# Patient Record
Sex: MALE | Race: WHITE | NOT HISPANIC OR LATINO | URBAN - METROPOLITAN AREA
[De-identification: names, ages, dates, MRNs, and addresses within clinical notes are randomized per-mention and may not be internally consistent; named-entity substitution may affect disease eponyms.]

---

## 2017-12-27 ENCOUNTER — FOLLOW UP ESTABLISHED (OUTPATIENT)
Dept: URBAN - METROPOLITAN AREA CLINIC 44 | Facility: CLINIC | Age: 82
End: 2017-12-27
Payer: MEDICARE

## 2017-12-27 DIAGNOSIS — H35.3131 NEXDTVE AGE-RELATED MCLR DEGN, BILATERAL, EARLY DRY STAGE: Primary | ICD-10-CM

## 2017-12-27 DIAGNOSIS — H40.1132 PRIMARY OPEN-ANGLE GLAUCOMA, BILATERAL, MODERATE STAGE: ICD-10-CM

## 2017-12-27 PROCEDURE — 92014 COMPRE OPH EXAM EST PT 1/>: CPT | Performed by: OPTOMETRIST

## 2017-12-27 PROCEDURE — 92134 CPTRZ OPH DX IMG PST SGM RTA: CPT | Performed by: OPTOMETRIST

## 2017-12-27 ASSESSMENT — INTRAOCULAR PRESSURE
OD: 17
OS: 18
OS: 17
OD: 18

## 2017-12-27 ASSESSMENT — KERATOMETRY
OD: 42.50
OS: 42.50

## 2017-12-27 ASSESSMENT — VISUAL ACUITY
OD: 20/20
OS: 20/25

## 2018-04-10 ENCOUNTER — FOLLOW UP ESTABLISHED (OUTPATIENT)
Dept: URBAN - METROPOLITAN AREA CLINIC 44 | Facility: CLINIC | Age: 83
End: 2018-04-10
Payer: MEDICARE

## 2018-04-10 PROCEDURE — 92133 CPTRZD OPH DX IMG PST SGM ON: CPT | Performed by: OPTOMETRIST

## 2018-04-10 PROCEDURE — 92014 COMPRE OPH EXAM EST PT 1/>: CPT | Performed by: OPTOMETRIST

## 2018-04-10 ASSESSMENT — INTRAOCULAR PRESSURE
OD: 15
OS: 14

## 2018-06-21 ENCOUNTER — HOSPITAL ENCOUNTER (INPATIENT)
Dept: HOSPITAL 46 - ED | Age: 83
LOS: 7 days | Discharge: NURSING, REHAB OR LONG-TERM CARE | DRG: 291 | End: 2018-06-28
Attending: INTERNAL MEDICINE | Admitting: INTERNAL MEDICINE
Payer: MEDICARE

## 2018-06-21 VITALS — BODY MASS INDEX: 30.98 KG/M2 | WEIGHT: 209.15 LBS | HEIGHT: 69 IN

## 2018-06-21 DIAGNOSIS — G30.9: ICD-10-CM

## 2018-06-21 DIAGNOSIS — N40.0: ICD-10-CM

## 2018-06-21 DIAGNOSIS — J96.01: ICD-10-CM

## 2018-06-21 DIAGNOSIS — E78.5: ICD-10-CM

## 2018-06-21 DIAGNOSIS — F02.80: ICD-10-CM

## 2018-06-21 DIAGNOSIS — Z66: ICD-10-CM

## 2018-06-21 DIAGNOSIS — Z79.01: ICD-10-CM

## 2018-06-21 DIAGNOSIS — Z79.899: ICD-10-CM

## 2018-06-21 DIAGNOSIS — I08.0: ICD-10-CM

## 2018-06-21 DIAGNOSIS — I48.2: ICD-10-CM

## 2018-06-21 DIAGNOSIS — Z87.891: ICD-10-CM

## 2018-06-21 DIAGNOSIS — Z88.0: ICD-10-CM

## 2018-06-21 DIAGNOSIS — I50.33: Primary | ICD-10-CM

## 2018-06-21 NOTE — NUR
RECEIVED PT TO FLOOR VIA STRETCHER. PT IS DISORIENTED TO TIME, DATE AND PLACE.
FACES ASSESSMENT IS 0. CALL LIGHT EDUCATION. ROOM ORIENTATION, BED ALARM IN
PLACE.

## 2018-06-21 NOTE — EKG
[facility]
                                    [address]
                                  [place]  [address]
_________________________________________________________________________________________
                                                                 Signed   
 
 
Atrial fibrillation with premature ventricular or aberrantly conducted complexes
Left axis deviation
Pulmonary disease pattern
Abnormal ECG
No previous ECGs available
Confirmed by AIDEE RENDON MD ([id number]) on 6/21/2018 6:29:07 PM
 
 
 
 
 
 
 
 
 
 
 
 
 
 
 
 
 
 
 
 
 
 
 
 
 
 
 
 
 
 
 
 
 
 
 
 
 
 
 
    Electronically Signed By: AIDEE RENDON MD  06/21/18 1829
_________________________________________________________________________________________
PATIENT NAME:     ADRIANOMAYRA JUSTO                   
MEDICAL RECORD #: [id number]                     Electrocardiogram             
          ACCT #: [id number]  
DATE OF BIRTH:   [birth year]                                       
PHYSICIAN:   AIDEE RENDON MD                   REPORT #: [id number]
REPORT IS CONFIDENTIAL AND NOT TO BE RELEASED WITHOUT AUTHORIZATION

## 2018-06-21 NOTE — NUR
ASSESSMENT COMPLETED. PT ON TELE # 7. HR IRREGULAR. LUNGS HAVE CRACKLES
THROUGHOUT. 2+ PITTING EDEMA BILAT LE. PULSE +1 PEDIS AND RADIAL. CAP REFILL
<3 SEC. PT HAS GARBLED SPEECH AND IS SLOW TO RESPOND. UNABLE TO ANSWER ALL
QUESTIONS. BOWEL TONES ACTIVE. PT IS SL. CALL LIGHT WITHIN REACH. BED ALARM IN
PLACE. ASSISTED PT TO STAND AT SIDE OF BED TO USE URINAL. 4L NC IN PLACE.
RESPIRATIONS ARW EQUAL AND UNLABORED.

## 2018-06-22 NOTE — NUR
levaquin given. pt back to bed. had a coughing fit. elevated hob, gave vomitis
bag. and tissue. able to cough up ome sputum and spit out. better now.. bed
alarm in placve. call light within reach. visible form nursingf station.

## 2018-06-22 NOTE — NUR
PT UP AT BEDSIDE AND BED ALARM NOT WORKING, REDIRECTED BACK TO BED.  BED ALARM
IS NOT WORKING, PLACED PRESSURE ALARM AND PT UP TO CHAIR.  BED ALARM CALLED IN
TO SERVICE.

## 2018-06-22 NOTE — NUR
PT APPEARS TO BE SLEEPING. RESPIRATIONS EQUAL AND NONLABORED. 4L NC IN PLACE.
TELE # 7. IRREGULAR RATE. HR IS 67. CALL LIGHT WITHIN REACH,. BED ALARM IN
PLACE.

## 2018-06-22 NOTE — NUR
PT HAS BASELINE DEMENTIA, CONFUSED AND OUT OF BED.  RETURNED TO BED AND PLACED
TELE LEADS, PLACED BED ALARM ON.  PT DENIES SOB, NO DISTRESS.  HELPED PT WITH
BREAKFAST.  PT UNABLE TO USE URINAL INDEPENDENTLY, ABLE TO URINATE WITH
ASSIST.  WILL CONTINUE TO MONITOR.

## 2018-06-22 NOTE — NUR
PT HAS BEEN UP TO THE BATHROOM SEVERAL TIMES THIS MORNING, NOT CALL LIGHT
APPROPRIATE.  CHAIR ALARM IN PLACE.  PT IS CONFUSED AND FORGETFUL.  PT
CONTINUES TO WEAR OXYGEN.  REORIENTED PATIENT SEVERAL TIMES.  PT EATING LUNCH
INDEPENDENTLY, HR 70'S-80'S PER TELE.  WILL CONTINUE TO MONITOR.

## 2018-06-22 NOTE — NUR
PT CAME TO FLOOR LAST NIGHT. SLEPT THROUGHOUT THE NIGHT. CRACKLES THROUGHOUT.
2L O2. BED ALARM IN PLACE. PT IS CONFUSED. CARDIAC DIET. KNEES BUCKLE AND ARE
WEAK. PT STOOD AT SIDE OF BED WITH URINAL. LABS THIS AM.

## 2018-06-22 NOTE — NUR
RETURNED A CALL TO PATIENT'S GRAND-DAUGHTER KARAN. KARAN HAD BEEN TOLD BY A
PREVIOUS VISITOR FROM [facility] THAT THE PATIENT'S HANDS WERE MOTTLED WHEN SHE
CAME TO SEE THE PATIENT. THIS RN INFORMED KARAN THAT I HAD JUST SEEN THE
PATIENT AND HIS HANDS WERE NOT MOTTLED, HANDS WERE WARM, CAPILLARY REFILL WAS
WNL, PATIENT HAD EQUAL  AND WAS FOLLOWING COMMANDS. KARAN INFORMED ME SHE
JUST WANTED TO CHECK. GRANDDAUGHTER IS ALSO LOOKING FOR A RESULT ON A CARDIAC
ECHO. I INFORMED HER I HAD NOT SEEN A REPORT ON THE PATIENT'S ECHO AT THIS
TIME, BUT WOULD LEAVE A NOTE FOR SOMEONE TO TRY AND CALL IF RESULTS CAME BACK.
PATIENT CURRENTLY RESTING ON HIS RIGHT SIDE EYES CLOSED RESPIRATIONS EVEN AND
REGULAR.

## 2018-06-22 NOTE — NUR
PT HAS HAD A DIFFICULT TIME THIS SHIFT WITH CONFUSION AND HAS HAD TO BE
REORIENTED SEVERAL TIMES.  BED ALARM/CHAIR ALARM IN USE.  LUNGS CRACKLES AND
DIMINISHED BILATERALLY, AFEBRILE, NO EDEMA.  PT HAS ATE WELL TODAY.  OUTPUT
CONSISTENT, ABLE TO USE URINAL WITH ASSIST.  PT HAS REMOVED OXYGEN SEVERAL
TIMES THIS SHIFT, SATS STABLE WHEN HE KEEPS IT IN PLACE.

## 2018-06-22 NOTE — NUR
CLINICALS FAXED TO [facility] [phone].  FAX CONFIRMATION RECEIVED.
MESSAGE LEFT FOR RAJAN ADMITTING [phone].

## 2018-06-22 NOTE — NUR
VITALS AND I&OS DONE AND CHARTED. HELPED ASIM VENTURA GET PT BACK TO BED FROM THE
BATHROOM. PT VERY UNSTEADY. BED ALARM PUT ON. CALL LIGHT WITHIN REACH.

## 2018-06-22 NOTE — NUR
CHANGED TELE BATTERY AND ALSO REMINDED HIM TO PUT HIS OXYGEN BACK ON SITTING
IN CHAIR WITH CHAIR ALARM ON

## 2018-06-22 NOTE — NUR
THIS CNA ENTERED PATIENTS ROOM. PATIENT WAS STANDING IN BATHROOM ALONE, NO
LIGHTS ON, OXYGEN TUBING WAS TIED TO BATHROOM DOOR. BED RAILS WERE UP. WHEN
ASKED, PATIENT STATES HE TIED THE OXYGEN MASK, AND HE CRAWLED OVER THE BED
RAILS BECAUSE HE HAD TO USE THE BATHROOM. PATIENT STATES HE DID NOT KNOW HOW
TO CALL FOR HELP. RN NOTIFIED.

## 2018-06-22 NOTE — NUR
PT HAS HAD FAMILY AT BEDSIDE OFF AND ON THROUGHOUT THE AFTERNOON.  PT HAS
CONTINUE TO EXHIBIT CONFUSION, AND WILL GET UP OUT OF THE CHAIR.  PT TRIED TO
DRESS HIMSELF, AND NURSING STAFF DRESSED AND REORIENTED PATIENT.  PT HAS TAKEN
MEDICATIONS WITHOUT DIFFICULTY, AND HAS EATEN MEALS APPROPRIATELY.  WILL
CONTINUE TO MONITOR.

## 2018-06-22 NOTE — NUR
SPOKE AT LENGTH WITH SON BUD AND WIFE BECKY.  PATIENT LIVES WITH THEM.  THEY
STATE HE HAS HAD DEMENTIA FOR OVER 6 YEARS AND THE LAST FEW MONTHS HE HAS
GOTTON MARKEDLY WORSE.  THEY STATE HE STILL KNOWS THEM, BUT CANNOT DO OWN
ADL'S OR EVEN AMBULATE SAFELY.  THEY HAVE A WALKER AND CANE.  THEY STATE THEY
ARE READY TO LOOK AT HIM MOVING TO A CARE FACILITY.  THEY ARE HOPING TO GET
HIM SITUATED SOMEWHERE IN THE [facility].  WE DISCUSSED THAT PATIENT
MAY NEED REHAB STAY.  THEY WOULD LIKE TO TRY [facility] WHICH IS [facility]ENTRAL
FOR THEIR FAMILY.
 
UPDATED DR RENDON.

## 2018-06-22 NOTE — NUR
PT SITTING UP AT SIDE OF BED.  PT PLEASANT, BUT UNABLE TO ANSWER QUESTIONS
APPROPRIATELY.  STATES HE LIVES WITH FAMILY.  DOESN'T KNOW OTHER MORE DIRECT
QUESTIONS.  HAS A CANE IN THE ROOM.  STAFF WILL LET ME KNOW WHEN FAMILY
ARRIVES.

## 2018-06-22 NOTE — NUR
PT APPEARS TO BE SLEEPING. 4L NC IN PLACE. BED ALARM ON, CALL LIGHT WITHIN
REACH. RESPIRATIONS EQUAL AND NONLABORED.

## 2018-06-23 NOTE — NUR
PT HAS BEEN REFUSING TO WEAR OXYGEN AND HAS REPEATEDLY ASKED TO GO HOME,
PHYSICIAN AT BEDSIDE.  REORIENTED PT AND OFFERED A WALK, PT GIVEN WALKER AND
STAFF OFFERED REASSURANCE.  PT WALKING IN HALLS WITH STAFF THIS AFTERNOON,
WITH O2 NASAL CANNULA.
 
TELE DISCONTINUED PER ORDERS.

## 2018-06-23 NOTE — NUR
RESTING, ON ROOM, AIR, LEGS DANGLING. NO RESP DISTRESS, NO S/SX PAIN. TELE#1
IN PLACE, LEFT ARM CAST INPLACE. IVF INFUSING, BED ALARM ON, PT ACROSS FROM
NSG STATION. HIGH FALL RISK PROTOCOL IN PLACE

## 2018-06-23 NOTE — NUR
PATIENT HAS SLEPT A FAIR AMOUNT THIS SHIFT. PATIENT HAS BEEN AWAKE X3 TONIGHT
TO USE THE URINAL AND HE DOES THIS WELL WITH ASSISTANCE. PATIENT REPOSITIONED
AND TURNED WHEN AWAKE. PATIENT REMAINS ON 4L/NC WITH DEMINISHED LUNG SOUNDS
WITH FIND CRACKLES. BOWEL TONES ACTIVE. PATIENT DID STAND AT THE BEDSIDE X1
WITH 1 PERSON ASSIST TO SHUFFLE TOWARDS THE HEAD OF THE BED TO REPOSITION AND
TOLERATED THIS WELL. PATIENT HAS NOT TAKEN IN MUCH FOR PO FLUIDS THIS SHIFT,
BUT HAS NOT BEEN THIRSTY. IV FLUSHES WITHOUT DIFFICULTY AND PATIENT IS
CURRENTLY SITTING UP IN BED WATCHING TV. CALL LIGHT IS IN REACH. PATIENT'S
GRANDDAUGHTER KARAN WOULD LIKE TO BE CALLED WHEN PATIENT'S ECHO REPORT COMES
BACK. PHONE # [phone]. I WILL PASS THIS ON IN REPORT.

## 2018-06-23 NOTE — NUR
PT IS SITTING IN A CHAIR WITH ALARM ON, LISTENING TO MUSIC AND READING THE
PAPER QUIETLY.  NO DISTRESS.  WILL CONTINUE TO MONITOR.

## 2018-06-23 NOTE — NUR
PT RESTING IN BED, RESPIRATIONS EVEN AND UNLABORED, EYES CLOSED, PT ALERT TO
VOICE AND PT TAKES FLOMAX PO. ASSESSMENT PERFORMED AND PT DENIES NEEDS AT THIS
TIME. PT APPEARS COMFORTABLE, CALL LIGHT IN REACH.

## 2018-06-23 NOTE — NUR
PATIENT ASSISTED TO USE THE URINAL AND VOIDED 150MLS. PATIENT IN NO PAIN AND
HAS BEEN RESTING QUIETLY. BED ALARM IS ON AND PATIENT REMAINS PLEASANTLY
CONFUSED AND FORGETFUL. CALL LIGHT IN REACH AND PATIENT'S BED IS DRY.

## 2018-06-23 NOTE — NUR
PT IN BED EATING BREAKFAST, FAMILY AT BEDSIDE, BED ALARM ON.  NO DISTRESS.  PT
STATES HE WANTS TO "GO HOME".  PT USING NC, SATS STABLE. PT FORGETFUL,
CONFUSED, REORIENTED PATIENT.  WILL CONTINUE TO MONITOR.

## 2018-06-23 NOTE — NUR
PT RESTING IN BED ON RIGHT LATERAL SIDE. APPEARS TO BE SLEEPING COMFORTABLY IN
VIEW OF NURSING STATION.

## 2018-06-23 NOTE — NUR
CHARGE NURSE ROUNDING NOTE: PT RESTING, O24LNC IN PLACE, NO SOB. BED ALARM ON,
HIGH FALL PRECAUTION IN PLACE.NO S/SX DISTRESS. CALL LIGHT AND FLUIDS WITHIN
HANDS REACH. PT ACROSS FROM NSG STATION

## 2018-06-23 NOTE — NUR
PATIENT FINISHED UP WITH A NEB TREATMENT PER RT AND IS AT 92% ON 22L/NC AT
THIS TIME. PATIENT WAS 86% PRIOR TO NEB TREATMENT AND ON ROOM AIR. PULSE OX
DID GET BETTER WITH THE NEB PUT STILL REQUIRED 2L/NC TO GET PATIENT ABOVE 90%
SATURATION. NURSE INITIATED ORDERS FOR TITRATION OF O2 AND CONTINOUSD PULSE OX
TO STAY ABOVE 90%.

## 2018-06-24 NOTE — NUR
PT RESTING IN BED, RESPIRATIONS EVEN AND UNLABORED AT 20 ON 4LPNC. CALL LIGHT
IN REACH, BED ALARM ON AND PT APPEARS TO BE SLEEPING COMFORTABLY.

## 2018-06-24 NOTE — NUR
UP TO BSC, VOIDED SNMALL AMOUNTS YELLOW URINE. TWO PERSON ASSIST AND FWW.
TOLERATED WELL, BACK TO BED, TAKS O2 TUBING OFF NARE, REPOSITIONED BACK TO
NOSE.  COOPERATIVE, NO C/O SOB STATED OR NOTED

## 2018-06-24 NOTE — NUR
pt has slept most of the night. pt did attemtpt to get  up out of bed several
times without using call light, each time pt was pleasantly confused and was
reoriented to place/situation and to call light. Each time pt attempted to get
out of bed he states he needed to use restroom and was assisted to commode to
void. pt has expressed that he would like to go home soon.

## 2018-06-24 NOTE — NUR
PT HAS HAD BASELINE CONFUSION THROUGHOUT SHIFT.  PT MOOD IMPROVED
SIGNIFICANTLY WHEN FAMILY WAS PRESENT THIS AFTERNOON.  PT LUNG SOUNDS ARE
UNCHANGED, DIMINISHED WITH DRY COUGH.  PT USING OXYGEN.  BED ALARM IN PLACE.
PT HAS BEEN DIRECTABLE BUT FORGETFUL.

## 2018-06-24 NOTE — NUR
CHANGED BED LINENS. GOT HIM A CUP OF CRANBERRY JUICE. PATIENT IS SITTING IN
HIS CHAIR LISTENING MUSIC.

## 2018-06-24 NOTE — NUR
pt attempting to get up out of bed. pt states "i got to pee". Pt assisted to
up bedside to use urinal with standby assist. pt voids approx 100mls clear
yellow urine in urinal and pt transfered to bedside commode per pt request. pt
voids a small amount in commode before he was further assisted back into bed.
Pt required frequent direction during transfer to/from commode. pt changed
into clean attends and provided with warm blanket. Bed alarm on and pt within
view of nurses station.

## 2018-06-24 NOTE — NUR
PT VISITING WITH FAMILY FRIEND AT BEDSIDE.  PT HAS CHAIR ALARM IN PLACE AND
HAS BEEN UP WITH WALKER WITH ASSIST.  WILL CONTINUE TO MONITOR.

## 2018-06-24 NOTE — NUR
PT GRANDDAUGHTER AND OTHER FAMILY AT BEDSIDE.  GAVE A FULL UPDATE TO FAMILY
ABOUT PLAN OF CARE AND MEDICAL STATUS.  PT RETURNED TO BED, BED ALARM ON, PT
GIVEN WARM BLANKETS AND IS RESTING COMFORTABLY.  WILL CONTINUE TO MONITOR.

## 2018-06-24 NOTE — NUR
uP TP BRP, VOIDED, BACK TO BED. 2 PERSON ASSIST AND FWW. TOLERATED WELL. O2 ON
AT 4L NC. NO C/O PAIN. BACK TO BED, BED ALARM ON. HIGH FALL RISK PRECAUTIONS
ON

## 2018-06-24 NOTE — NUR
PT UP TO CHAIR FOR BREAKFAST THIS AM.  PT IS CONFUSED AND HAS BEEN REORIENTED
SEVERAL TIMES.  PT STATES HE DOESN'T FEEL WELL BUT CANNOT GIVE A REASON WHY,
DENIES SOB.  USING NASAL CANNULA, AND UP WITH WALKER AND ASSIST.

## 2018-06-25 NOTE — NUR
NOTED THAT INSERTION SITE REDDENED SLIGHTLY.  PT DENIED PAIN AT INSERTION SITE
WITH PALPATION AND WITHOUT.  NOTIFIED DR. RENDON, REQUESTED ORDER TO LEAVE IV
OUT, DR. RENDON STATED THAT IT WAS OK TO LEAVE IV OUT.

## 2018-06-25 NOTE — NUR
PT WAS SITTING UP IN RECLINER, READING NEWSPAPER.  PT THEN STOOD UP FROM CHAIR
WITHOUT CALLING FOR ASSISTANCE, CHAIR ALARM SOUNDED, PARKER, CNA TO ROOM, TO
PT'S SIDE IMEDIATELY.  WILBERT CANALES ASSISTING PT TO BATHROOM.  PT REMINDED TO
USE CALL LIGHT.

## 2018-06-25 NOTE — NUR
PT SITTING UP IN CHAIR, LUNCH ON TRAY IN FRONT OF HIM. HE DIDN'T SEEM TO BE
AWARE IT WAS THERE, AND HIS COGNITIVE ABILITIES SEEM TO BE DIMINISHED FROM
LAST WEEK. HE WAS HAVING A DIFFICULT TIME TRYING TO CONNECT WHO I AM AND MY
TITLE. I ENCOURAGED HIM TO EAT SOME LUNCH, SAID HE TRIED TO GET ONE OF THE
STAFF TO EAT IT. THEN HE TRIED TO GET UP, HIS SLIPPERS WERE SLICK AND WAS
MAKING IT DIFFICULT FOR PT TO GET TRACTION TO STAND UP. CNA CAME IN AND HE
ASKED FOR HIS WALKER AND HEADED FOR BATHROOM WITH ASSIST FROM WILBERT. WILL BE
AVAILABLE TO FOLLOW AS NEEDED

## 2018-06-25 NOTE — NUR
up to brp, alarm gfoing off. up to brp with one assist and fww, voided and had
large soft-formed bm. back to bed. tolerated well. no sob with exertion noted.
O2 4L NC with humidifier inplace. Bed alarm on, Coop with lab draw. Call light
and fresh fluid withing hands reach

## 2018-06-25 NOTE — NUR
SPOKE WITH PATIENT IN ROOM.  PATIENT [place] AND HAD SOME UNDERLYING DEMENTIA.
BUT ANSWERS QUESTIONS APPROPRIATELY.  PATIENT ORIENTED TO PERSON, PLACE.  HE
IS NOT SURE WHEN HIS FAMILY MIGHT BE IN.  DISCUSSED POSSIBLE STAY AT REHAB
BEFORE RETURNING TO HOME.  HE SEEMS AWARE OF THIS, STATES "I GUESS SO".
PATIENT STATES I SHOULD TALKE WITH "BUD".

## 2018-06-25 NOTE — NUR
NOTIFIED DR. RENDON THAT PT'S URINE OUTPUT IN LAST 4 HOURS  ML.  DR. RENDON GAVE NO NEW ORDERS AT THIS TIME.

## 2018-06-25 NOTE — NUR
PT UP WITH 1 PERSON ASSIST WITH FWW.  REMAINS DISORIENTED TO ALL EXCEPT SELF
AND FRIEND.  LUNGS DIMINISHED TO DIMINISHED WITH FINE CRACKLES IN BASES.
REMAINS ON 4L O2 VIA NC.  TOLERATING PO INTAKE WELL.  USED BED AND CHAIR ALARM
ALL SHIFT, AS PT CONTINUES TO GET UP WITHOUT CALLING FOR ASSISTANCE.  IV D/C'D
PER DR. RENDON.

## 2018-06-25 NOTE — NUR
PT IS RESTING WITH EYES CLOSED, RESPIRATIONS ARE EVEN AND NONLABORED. BED
ALARM IS ON AND CALL LIGHT IS WITHIN REACH.

## 2018-06-25 NOTE — NUR
RECEIVED A MESSAGE FROM SKYLAR SHORT [facility]
[phone].  RETURNED THE CALL.  SPOKE WITH GILA DAILEY.  SHE STATES FAMILY
WAS ADVISED BY HER THAT REHAB STAY WOULD HELP, BUT THEY DO NOT WANT TO HAVE TO
MOVE HIM A SECOND TIME.  THEY HAVE ARRANGED FOR PATIENT TO MOVE IN THERE ON
THURSDAY.  EXPLAINED THAT PATIENT WILL BE DISCHARGED HERE TOMORROW.  HE HAS
BEEN ACCEPTED AT [facility] IN [place].  IF THEY REFUSE THAT, THEY CAN TAKE HIM
HOME UNTIL ADMITTING THERE.  SHE STATES THE ONLY THING THEY NEED IS CLINICALS
ORDERS FOR OXYGEN, ORDERS FOR PT/OT HOME HEALTH, AND MEDICATION PRESCRIPTIONS.
THESE CAN BE FAXED [phone] AT DISCHARGE.
 
UPDATED DR STEPHENSON AND STAFF.

## 2018-06-25 NOTE — NUR
PT CURRENTLY IN BED, RESTING, EYES CLOSED, O2 4LNC WITH HUMIDIFIER IN PLACE,
NO S/SX SOB. uP TO BRP, SEVERAL TIMES WITH ONE PERSON ASSIST AND FWW. HAS
VOIDED, NO BM THIS SHIFT. PT CONFUSED AT TIMES, EASILY REDIRECTABLE, DOES NOT
USE CALL LIGHT. CONTINUES ON DAILY WEIGHT . CURRENTLU WEIGHT .1#,
STANDING SCALE, AN INCREASE OF ALMOST 2# SINCE YESTERDAY. TOLERATES FLUIDS
WELL, NO COUGH PRESENT, EDEMA OF LEGS SLOWLY IMPROVING, LEGS ELEVATED. CALL
LIGHT AND FLUIDS WITHING HAND REACH. HIGH FAL RISK PROTOCOL IN PLACE, BED
ALARM ON, RAILS UPX3

## 2018-06-25 NOTE — NUR
Up to brp with one assist and fww, voided, back to bed. O2 on 4L NC, tolerated
well. was very cooperative. Bed alarm on.

## 2018-06-25 NOTE — NUR
IN ROOM TO ASSESS PT AND ADMINISTER MEDICATION. PT HAS DEMENTIA AND IS
FORGETFUL BUT HE IS AWARE AT THIS TIME THAT HE IS AT THE HOSPITAL AND HE CAN
STATE HIS NAME AND . HE DENIES PAIN AND ANY NEEDS AT THIS TIME. CALL LIGHT
IS WITHIN REACH AND BED ALARM IS ON. HE WAS JUST UP TO THE RESTROOM.

## 2018-06-25 NOTE — NUR
PT IN BED, AWAKE, ALERT.  RECIEVED BEDSIDE REPORT FROM ASIM VENTURA.  BED ALARM
ON, PERSONAL SUPLIES AND CALL LIGHT IN REACH.

## 2018-06-25 NOTE — NUR
ASKED BY STAFF TO SPEAK WITH A FACILITY THAT CALLED.  SPOKE WITH JULIANNA OCHOA.  SHE STATES FAMILY IS THERE NOW.  THEY ARE A
ASSISTED LIVING FACILITY IN [place].  SHE WAS GIVEN AN UPDATE ON PATIENT.
SHE STATES IT SOUNDS LIKE HE SHOULD DO REHAB BEFORE MOVING THERE.  DISCUSSED
THAT THIS WAS THE PLAN FRIDAY, AND IS THE DOCTORS PLAN.  I ALSO SPOKE WITH
PATIENT THIS MORNING AND HE SEEMS TO UNDERSTAND THIS.  EXPLAINED THAT I LEFT
MESSAGE ON SONS PHONE TO CALL ME IN REGARDS TO THIS PLAN.  SHE STATES SHE WILL
SPEAK WITH FAMILY, AS THEY ARE CURRENTLY AT THEIR FACILITY.

## 2018-06-25 NOTE — NUR
PT SITTING UP IN RECLINER, VISITING WITH FRIEND.  PT ALERT, BUT DISORIENTED TO
ALL EXCEPT HIS FRIEND.  ABLE TO RECALL FRIEND'S NICKNAME, AND ALSO RECALL HOW
THEY MET.  PT DENIED PAIN.  PERSONAL SUPPLIES IN REACH.  PROVIDED EDUCATION
REGARDING DEEP BREATHING AND COUGHING, WHICH PT RETURN DEMONSTRATED.  ALSO
PROVIDED EDUCATION VERBALLY REGARDING FALL PREVENTION, USE OF CALL LIGHT, NEED
TO WAIT FOR NURSING STAFF TO BE WITH HIM PRIOR TO ANY TRANSFERS, ANY TIME THAT
HE IS NOT SITTING OR LAYING DOWN.  PT VERBALIZED UNDERSTANDING.  PT UNABLE TO
RETURN DEMONSTRATE USE OF CALL BUTTON UNTIL 3 VERBAL CUES WERE GIVEN.  CHAIR
ALARM ON.  PT DENIED NEEDS.

## 2018-06-25 NOTE — NUR
PT IS AWAKE IN THE CHAIR WITH CHAIR ALARM ON HE DENIES NEEDS AT THIS TIME.
CALL LIGHT IS WITHIN REACH.

## 2018-06-26 NOTE — NUR
PT RESTING IN CHAIR, FAMILY AT BEDSIDE. PT ON 3L NC, O2 SATS 95%, WEANED TO 2L
NC, LUNG SOUNDS CLEAR WITH CRACKLES TO RIGHT LOWER LOBE. EDEMA TO BLE SLIGHTLY
IMPROVED. PT CONTINUES TO BE DISORIENTED TO ALL BUT SELF. NO ACUTE CHANGES. PT
AND FAMILY DENY OTHER NEEDS AT THIS TIME. IV LASIX GIVEN.

## 2018-06-26 NOTE — NUR
PT IS ORIENTED TO SELF AND THE FACT THAT HE IS IN THE HOSPITAL. HE IS A SBA
WITH FWW AND REQUIRED A BED OR CHAIR ALARM AS HE DOES NOT USE THE CALL LIGHT
PROPERLY. HE IS ON 4LNC AND HAS NO IV SITE. PER NOTES PT LIVES WITH SON.

## 2018-06-26 NOTE — NUR
HELPED PT TO THE RESTROOM AND ASSESSED HIM. HE IS ORIENTED TO SELF AT THIS
TIME. HE REQUIRES THE USE OF THE BED ALARM. HE IS PLEASANT AND COOPERATIVE
OTHER THAN NOT USING THE CALL LIGHT APPROPRIATELY. HE DENIES PAIN OR ANY NEEDS
AT THIS TIME. HIS MEDICATION WAS ADMINISTERED. CALL LIGHT IS WITHIN REACH AND
BEDALARM IS ON.

## 2018-06-26 NOTE — NUR
IV PLACED BY ASIM WHITLEY TO RIGHT FOREARM. IV LASIX GIVEN. CONTINUOUS PULSE OX IN
PLACE, O2 SATS 96% ON 3L. PT SITTING IN CHAIR, BED ALARM ON. FLUID RESTRICTION
STARTED.

## 2018-06-26 NOTE — NUR
**********CARE CONFERENCE*********
ATTENDEES:  PT, SON BUD, GRANDDAUGHTER KARAN, GREAT GRANDDAUGHTER.
STAFF:  MYSELF CASE MANAGEMENT, ENZO DAILEY, [facility] CHW
DISCUSSION WAS HAD REGARDING FUTURE PLANS FOR THE PT TO GO TO AN ASSISTED
LIVING FACILITY IN [facility], BUT FIRST THE FAMILY AGREE PT NEEDS TO GO TO A
SNF.  THEY AGAIN STATED THAT THEY DIDN'T WANT HIM GOING TO [facility] IN
[place].  I INFORMED THEM AGAIN THAT ARRANGEMENTS HAD BEEN MADE WITH [facility]
IN THE PARK IN [place] FOR WHEN HE IS READY FOR DC.  FAMILY STATED THAT
THEY HAD BEEN THERE ALREADY, SEEN THE FACILITY.  PT WATCHING AS THE
CONVERSATIONS ARE GOING ON BUT NOT CONTRIBUTING ANYTHING.

## 2018-06-26 NOTE — NUR
BEDSIDE HANDOFF REPORT RECEIVED FROM NIGHT SHIFT RN. PT RESTING IN BED. PT
DENIES NEEDS AT THIS TIME.

## 2018-06-26 NOTE — NUR
PT WEANED TO 2L NC, CONTINUOUS PULSE OX, LUNG SOUNDS WITH CRACKLES TO RIGHT
LOWER LOBE. IV PLACED TODAY FOR IV LASIX. PT TOLERATING REGULAR DIET, FLUID
RESTRICTION BEGAN. PT CONTINUES TO BE DISORIENTED TO ALL BUT SELF. UP WITH 1PA
WITH FWW, BED ALARM/CHAIR ALARM. PT VOIDING QS, HAD BM TODAY.

## 2018-06-26 NOTE — NUR
PT WAS UP TO RESTROOM AND BACK TO BED. CNA REPORTED PT TRIED TO HAVE A BM AND
THERE WAS SLIGHT SMEARS OF BLOOD ON TOILET PAPER. WILL CONTINUE TO MONITOR.

## 2018-06-26 NOTE — NUR
PT FAMILY AT BEDSIDE, REQUESTING UPDATE. UPDATE PROVIDED ON ECHO RESULTS, LAB
RESULTS, PLAN OF CARE AND EXPECTED LENGTH OF STAY. QUESTIONS ANSWERED. FAMILY
AND PT DENY OTHER NEEDS AT THIS TIME.

## 2018-06-26 NOTE — NUR
RECIEVED A PHONE CALL FROM PT GRANDDAUGHTER KARAN THIS AM REQUESTING THAT SHE
AND THE FAMILY WOULD LIKE THE PT TO GO TO A SNF FOR PT PRIOR TO GOING TO THE
ASSISTED LIVING FACILITY, THEY DO STATE THAT THEY DO NOT WANT HIM GOING TO
[facility] IN [place]--WHICH IS WHERE PREVIOUS PLANS HAD BEEN MADE AND THEY HAD
A BED FOR HIM.  I NOTIFIED [facility] IN [place] THAT THE FAMILY HAD SAID NO TO
THEIR FACILITY.  I THEM CALLED [facility] IN THE [place] IN [place] AND [facility] AND [facility] [place].  [facility] CALLED ME BACK AND STATED THEY HAD A BED AVAILABLE AND WOULD BE GLAD TO
TAKE HIM WHEN HE IS READY.  CALLED AND TALKED TO KARAN AGAIN AND SHE SAID SHE
COULD TAKE HIM THERE WHEN HE IS READY TO GO TO THIS FACILITY.  DR STEPHENSON CALLED
A SHORT TIME AFTER THIS AND STATED THAT HE DIDN'T THINK THE PT WAS READY TO GO
ANYWHERE AS YET AND THAT IT WOULD BE ANOTHER DAY OR SO TO DIURESE PT A LITTLE
MORE AND HOPEFULLY GET HIS NEED FOR O2 DOWN FROM 4 LITERS.  INFORMED THE
FAMILY OF THIS.

## 2018-06-26 NOTE — NUR
IN ROOM FOR REPORT, PT IS RESTING WITH EYES CLOSED ON 2LNC AND CONT PULSEOX.
CALL LIGHT IS WITHIN REACH AND BEDALARM IS ON.

## 2018-06-26 NOTE — NUR
PT IS RESTING WITH EYES CLOSED, RESPIRATIONS ARE EVEN AND NONLABORED. CALL
LIGHT IS WITHIN REACH AND BED ALARM IS ON.

## 2018-06-26 NOTE — NUR
PT IS RESTING WITH EYES CLOSED, RESPIRATIONS ARE EVEN AND NONLABORED ON 4 LNC.
CALL LIGHT IS WITHIN REACH AND BEDALARM IS ON.

## 2018-06-26 NOTE — NUR
PT FELL ALSEEP AND IS BREATHING THROUGH MOUTH PULSEOX DROPED TO LOW 80S.
PUT OXYMASK ON PT AND HE IS AT 6L TO GET O2 UP TO 91%. WILL CONTINUE TO
MONITOR.

## 2018-06-26 NOTE — NUR
TURNED PT'S O2 VIA OXYMASK TO 4 L, PULSEOX READS 91%, PT IS RESTING WITH EYES
CLOSED, AND BED ALARM IS ON.

## 2018-06-26 NOTE — NUR
PT RESTING IN BED SLEEPING. PT ON 4L NC, O2 SATS 88-91%, LUNG SOUNDS CLEAR
UPPER WITH CRACKLES TO BILATERAL LOWER LOBES, DENIES SOB. PT DENIES PAIN,
DENIES NAUSEA. PT CONFUSED, DISORIENTED TO ALL BUT SELF. PT BOWEL TONES
ACTIVE. EDEMA TO BLE, 2+, PULSES PALPABLE, CMS INTACT. PT WITHOUT IV ACCESS.
PT ENCOURAGED TO GET TO CHAIR FOR BREAKFAST, AGREEABLE, 1PA TO CHAIR, CHAIR
ALARM ON, CALL LIGHT WITHIN REACH. PT DENIES OTHER NEEDS AT THIS TIME.

## 2018-06-26 NOTE — NUR
PT GIVEN 6 MG COUMADIN PER ORDER. PT ASSITED TO BATHROOM WITH NURSE AIDE. PT
DENIES OTHER NEEDS AT THIS TIME.

## 2018-06-27 NOTE — NUR
THIS CNA ASSISTED PATIENT UP TO BATHROOM, BACK TO BEDSIDE RECLINER. BARRIER
CREAM AND PERICARE APPLIED. CALL LIGHT IN REACH. CHAIR ALARM ON. NO OTHER
NEEDS AT THIS TIME.

## 2018-06-27 NOTE — NUR
PT FAMILY AT BEDSIDE, REQUESTING TO SPEAK WITH DR. STEPHENSON REGUARDING TRANSFER
TO A FACILITY WITH CARDIOLOGY. DISCUSSED WITH DR. STEPHENSON, DR. STEPHENSON STATES THAT
HE HAD CONVERSTATION WITH GRANDDAUGHTER KARAN THIS AM AND REQUESTED RN TO
REITERATE WHAT WAS DISCUSSED THIS AM AND TO OBTAIN PHONE NUMBER OF
[facility] IN [place]. DISCUSSED WITH SON, TREATMENT PLAN, PT CONDITION AND
TRAETMENT AFTER DISCHARGE, SON STATES THAT THEY FEEL THEY ARE GETTING A LOT OF
INFORMATION FROM VARIOUS FAMILY MEMBERS WHICH IS CONFUSING, PHONE NUMBER FOR
[facility]QU[facility], GRANDDAUGHTER IN [place], WAS OBTAINED AND PROVIDED TO DR. STEPHENSON.

## 2018-06-27 NOTE — NUR
PT SITTING IN CHAIR, FAMILY AT BEDSIDE. PT GIVEN IV DIURETICS AND PO
POTASSIUM. PT DENIES NEEDS AT THIS TIME.

## 2018-06-27 NOTE — NUR
THIS CNA ASSISTED PATIENT TO SIT UP IN BED. PATIENT HAD NO OXYMASK ON WHEN
ENTERING, OXYGEN LEVELS APPEARED LOW. RN NOTIFIED, PATIENT BACK ON OXYMASK,
OXYGEN BACK UP TO NORMAL LEVELS. THIS CNA ASSISTED PATIENT TO STAND FOR
WEIGHT, AND TO WALK TO BEDSIDE RECLINER. PATIENT SITTING UP IN BEDSIDE
RECLINER, EATING BREAKFAST. FAMILY IN ROOM. PATIENT CALL LIGHT IN REACH. NO
OTHER NEEDS AT THIS TIME.

## 2018-06-27 NOTE — NUR
PATIENT IS RELAXING BETTER AT THIS TIME AND IS IN BED. RN IS AT THE BEDSIDE
VISITING WITH PATIENT AT THIS TIME. PATIENT IS WATCHING TV AND CONVERSING WITH
RN AT THIS TIME.

## 2018-06-27 NOTE — NUR
PT SLEPT MOST OF THE NIGHT WITH THE EXCEPTION OF BATHROOM BREAKS. BED ALARM
ON. HE STARTED THE NIGHT WITH 2 LNC BUT WHILE SLEEPING REQUIRED 4 L OXYMASK.
CONT PULSEOX ON AND IV IS SL. REGULAR DIET WITH FLUID RESTRICTION. PT
CONTINUES TO BE CONFUSED AND ORIENTED TO PERSON BUT HE IS PLEASANT. SBA WITH
FWW.

## 2018-06-27 NOTE — NUR
PT IS RESTING WITH EYES CLOSED, RESPIRATIONS ARE EVEN AND NONLABORED ON 4 L
OXYMASK. BED ALARM IS ON.

## 2018-06-27 NOTE — NUR
PT IS RESTING WITH EYES CLOSED, RESPIRATIONS ARE EVEN AND NONLABORED ON 4L
OXYMASK. BED ALARM IS ON.

## 2018-06-27 NOTE — NUR
CALLED  TO INFORM HIM PATIENT HAS HAD TO KEEP AN RN AT BEDSIDE SINCE
MY LAST NOT, PATIENT IS INSISTANT ON GETTING UP AND LEAVING. ASIM FITZGERALD, AT
BEDSIDE WITH THE PATIENT AT THIS TIME. DR. STEPHENSON REQUESTED I CONTACT THE RN
SUPERVISOR TO GET A 1:1 SITTER IF THAT WAS POSSIBLE AS HE DID NOT WANT TO GIVE
THE PATIENT ANY NEW MEDICATION AT THIS TIME IF IT IS POSSIBLE. CALLED
ASIM RANDOLPH SUPERVISOR AND SHE WILL BE CALLING DR. STEPHENSON BACK.

## 2018-06-27 NOTE — NUR
PT IS RESTING WITH EYES CLOSED, RESPIRATIONS ARE EVEN AND NONLABORED ON 4L
OXYMASK. BED ALARM IS ON AND CALL LIGHT IS WITHIN REACH.

## 2018-06-27 NOTE — NUR
PT SITTING IN CHAIR, LISTENING TO MUSIC ON CD PLAYER. HE WAVED ME IN, AND CON-
FUSED ME WITH THE DR. HE STUDIED MY BADGE, AND BEGAN TO TALK, BUT STATEMENTS
MADE LITTLE SENSE. COGNITIVE ABILITIES SEEM TO BE DECREASING, PT ASKED FOR A
CUP OF COFFEE. MENTIONED TO RN CY HUNG, WHO MENTIONED HE IS ON FLUID
RESTRICTION. WILL CONTINUE TO FOLLOW AS NEEDED

## 2018-06-27 NOTE — NUR
PATIENT RESTING QUIETLY IN HIS BED. EYES CLOSED, RESPIRATIONS EVEN AND
REGULAR. CALL LIGHT IN REACH.

## 2018-06-27 NOTE — NUR
PT CONTINUES TO BE DISORIENTED TO ALL BUT SELF, BED ALARM/CHAIR ALARM. PT
CONTINUES TO HAVE CRACKLES TO BILATER LOWER LOBES, CONTINUOUS PULSE OX, PT
DOES REMOVE AT TIMES. PT TOLERATING REGULAR DIET WITH 1600 ML FLUID
RESTRICTION, HAD TO LIMIT INTAKE TODAY. PT GIVEN IV LASIX 2 AND ACETAZOLAMIDE,
VOIDED QS. PT UP WITH SBA WITH FWW. PT HAD BM TODAY.

## 2018-06-27 NOTE — NUR
PT RESTING IN BED. PT ON 2L NC, LUNG SOUNDS CONTINUE TO HAVE CRACKLES IN
BILATERAL BASES. EDEMA TO BLE SIGNIFICANTLY IMPROVED, CONCENTRATED AROUND
ANKLES. NO ACUTE CHANGES. PT DENIES NEEDS AT THIS TIME. BED ALARM IN PLACE.

## 2018-06-27 NOTE — NUR
PATIENT AMBULATED WITH 1PSBA AND FWW TO THE BATHROOM AND VOIDED IN THE HAT.
PATIENT AMBULATED THE SAME BACK TO BED AND BED ALARM WAS SET. PATIENT THEN GOT
UP AND STARTED HEADING TOWARD THE DOOR, TALKED WITH THE PATIENT TRIED TO
REORIENT HIM AND HAVE HIM SIT BACK DOWN. PATIENT DOES NOT WANT TO BE IN BED
AND DOES NOT WANT TO SIT IN THE RECLINER. TRIED TO OFFER FOOD, FLUIDS, WARM
BLANKET, FIND SOMETHING ON TV FOR HIM. PATIENT INSISTS ON LEAVING. CALLED FOR
ADDITIONAL ASSISTANCE AND GOT PATIENT TO SIT BACK DOWN IN THE BED. CHARGE
NURSE TRYING TO GET PATIENT TO SETTLE DOWN AND IS SITTING WITH HIM AT THIS
TIME.

## 2018-06-27 NOTE — NUR
PT IS RESTING WITH EYES CLOSED, RESPIRATIONS ARE EVEN AND NONLABORED. ON 4L
OXYMASK, BED ALARM IS ON.

## 2018-06-27 NOTE — NUR
BEDSIDE HANDOFF REPORT RECEIVED FROM NIGHT SHIFT RN. PT SLEEPING, LEFT
UNDISTURBED. O2 SATS 94% ON 4L OXYMASK.

## 2018-06-27 NOTE — NUR
THIS CNA ASSISTED PATIENT UP FROM BATHROOM. PERICARE PERFORMED. PATIENT BACK
IN BEDSIDE RECLINER, CALL LIGHT IN REACH, FAMILY IN ROOM. CHAIR ALARM ON. NO
OTHER NEEDS AT THIS TIME.

## 2018-06-27 NOTE — NUR
PT SITTING IN CHAIR, EATING BREAKFATS, FRIEND AT BEDISDE. PT O2 SATS 92% ON 3L
NC, LUNG SOUNDS CONTINUE TO HAVE CRACKLES TO BILATERL BASES. PT TOLERATING
REGULAR DIET WITH FLUID RESTRICITON, BOWEL TONES ACTIVE, DENIES NAUSEA. PT
SALINE LOCKED, FLUSHED, PATENT. PT CMS INTACT, EDEMA IMRPOVED FROM YESTERDAY,
2+. PT DENIES OTHER NEEDS AT THIS TIME, CHAIR ALARM IN PLACE.

## 2018-06-28 NOTE — NUR
PATIENTS FAMILY ARRIVED AT 1130 AND WANTED TO TAKE PATIENT EARLY.  JILLIAN
GRANDDAUGHTER STATED SHE GOT HERE SOONER THAN EXPECTED.  BILL CLERK, PARKER
CALLED [facility] THE [facility] TO VERIFY PATIENT COULD ARRIVE EARLIER THAN
SCHEDULED.  THEY WERE FINE WITH THIS AND NURSE TO NURSE REPORT WAS GIVEN BY
STEVO DAILEY.

## 2018-06-28 NOTE — NUR
PATIENT RESTING QUIETLY SUPINE WITH HEAD OF THE BED ELEVATED AT ABOUT 25
DEGREES. EYES ARE CLOSED, RESPIRATIONS EVEN AND REGULAR AT A RATE OF 16BPM.
PULSE OX READS 93% AND PATIENT CURRENTLY ON 2L/NC. HR PER PULSE OX IS 72BPM.
PATIENT'S CALL LIGHT IS IN REACH.

## 2018-06-28 NOTE — NUR
PT SITTING IN CHAIR, WITH VISITOR IN RM. COGNITIVE ABILITIES SEEM TO DIMINISH
EVEN MORE. PT HAD DIFFICULTY AGAIN TODAY PROCESSING WHO I AM, EVEN WITH
VISITORS HELP. PT IS PLEASANT THOUGH, EXTENDED A BLESSING. JUST AFTER I LEFT,
HIS FAMILY CAME TO DC PT.

## 2018-06-28 NOTE — NUR
PT SITTING IN CHAIR. PT WEANED TO 1L NC, LUNG SOUNDS CONTINUES TO HAVE
CRACKLES ON BILATERAL BASES, IMPROVED FROM YESTERDAY. PT TOELRATNG REGULAR
DIET, DENIES NAUSEA, BOWEL TONES ACTIVE. PT WITH EDEMA TO BLE, 1-2+
CONCENTRATED AROUND ANKLES. PT GIVEN IV LASIX PER ORDER. PT CONTINUES TO BE
CONFUSED AT BASELINE, CHAIR ALARM IN PLACE. PLAN FOR DISCHARGE TODAY. PT
DENIES OTHER NEEDS AT THIS TIME.

## 2018-06-28 NOTE — NUR
SPOKE WITH GRANDDAUGHTER JILLIAN [phone].  SHE IS ASKING IF WE CAN LOOK
FOR A FACILITY IN THE [place], [place] AREA.  DISCUSSED WITH HER THAT ORDERS WERE
WRITTEN AND SENT THIS MORNING ON THE FACILITY THEY AGREED TO YESTERDAY WITH
, [facility].  SHE STATES SHE WAS JUST WONDERING ABOUT GETTING
HIM CLOSER TO THE ASSISTED LIVING PLACE HE WILL MOVE TO AFTER REHAB.  I
EXPLAINED THEY CAN SPEAK WITH [facility] THE [facility] AND OTHER OPTIONS IF THAT IS
WHAT THEY WISH DOWN THE ROAD.   SHE STATES "OH IT'S FINE.  WE WILL JUST GO
WITH THE ONE IN [place]".  WE DISCUSSED OPTIONS FOR TRANSFER, SHE ASKED
ABOUT AMBULANCE TRANSFER.  I EXPLAINED I CAN'T GUARANTEE MEDICARE WOULD COVER
THAT AS IT IS NOT MEDICALLY NECESSARY.  WE DISCUSSED THAT I UNDERSTOOD FAMILY
WAS AVAILABLE TO TRANSFER HIM.  SHE STATED "OH YES, WE CAN".  SHE STATED SHE
WOULD LEAVE IN ABOUT 2 HOURS.  WE AGREED THEN TO HAVE PATIENT READY FOR HER AT
1PM.

## 2018-06-28 NOTE — NUR
DISCHARGE TO FACILITY ORDERS, PASSR AND UPDATED NOTES EMAILED TO
[email or web address] AS REQUESTED IN A SECURE EMAIL.

## 2018-06-28 NOTE — NUR
PATIENT UP TO THE BATHROOM WITH 1PSBA AND FWW AND THEN BACK TO BED WITH
ASSISTANCE FROM STAFF. PATIENT BACK IN BED AND CALL LIGHT IN REACH.

## 2018-06-28 NOTE — NUR
AFTER PATIENT GOT DONE IN THE BATHROOM WE WALKED HIM BACK TO HIS CHAIR. THAN
WE GOT HIM DRESSED TO GO WITH FAMILY. AROUND [phone] THIS MORNING.

## 2018-06-28 NOTE — NUR
SPOKE WITH CL CHINO [facility] AT THE [place] [phone].  SHE STATES THEY
RECEIVED ORDERS AND ARE READY TO ACCEPT PATIENT.  WE DISCUSSED THAT FAMILY WAS
PLANNING ON LEAVING HERE AT 1PM.  SHE STATED WE COULD CALL NURSE REPORT TO
THIS NUMBER TO CHARGE NURSE AT DISCHARGE.  WE ALSO DISCUSSED PORTABLE OXYGEN
NEED, SHE STATES TO SEND HIM WITH A LINCARE TANK AS THEY USE LINCARE AT THE
FACILITY.

## 2018-06-28 NOTE — NUR
PATIENT UP TO THE RESTROOM WITH 1PSBA AND FWW. PATIENT VOIDED 225MLS AND HAD A
WET ATTENDS WHICH WAS CHANGED. PATIENT WT OBTAINED ON HIS WAY BACK TO
BED-209.1LBS. PATIENT'S SKIN TEAR FROM HIM SCRATCHING HIS LFA EARLIER IN THE
SHIFT WAS REDRESSED WITH NON-ADHERENT DRESSING AND COBAN. PATIENT REMAINS ON
2L/NC AND SATTING 97%. VS STABLE. PATIENT DRANK 100MLS OF WATER AND WAS GIVEN
A WARM BLANKET AND HE IS NOW RESTING QUIETLY, EYES CLOSED, RESPIRATIONS EVEN.
PATIENT SAID HE WAS NOT HAVING ANY PAIN.CALL LIGHT IN REACH AND PATIENT IS
VISIBLE FROM THE DESK. BED ALARM ON.

## 2018-06-28 NOTE — NUR
SPOKE WITH CL AT [facility] [place].  SHE STATES SHE DID GET
CLINICALS TUESDAY AND THEY WERE ANTICIPATING ADMITTING TODAY.  SHE IS
REQUESTING UPDATED NOTES.  I DISCUSSED THAT DR STEPHENSON HAS DISCHARGE ORDERS
FINISHED AND I CAN SEND THOSE ALSO.  SHE REQUESTS ALL TO BE SENT BY EMAIL TO
[email or web address]

## 2018-06-28 NOTE — NUR
REPORT GIVEN TO DAYSHIFT RN. PATIENT HAS BEEN UP X4 TONIGHT TO THE BATHROOM
AND VOIDED WITH 1PSBA AND FWW. PATIENT CONTINUES TO HAVE CRACKLES IN THE BILAT
LUNG BASES AND CLEAR IN THE UPPER LOBES. PATIENT IS ON 2L/NC AND CONTINUES TO
SAT GREATER THAN 90%. PATIENT NEEDED TO BE MONITORED A LITTLE CLOSER FOR THE
FIRST FEW HOURS OF THE SHIFT ,BUT DID FINE THE REST OF THE NIGHT AFTER HIS
INITIAL REACTION OF WANTING TO LEAVE. IV FLUSHES WELL. PATIENT CONTINUES TO
REST AT THIS TIME, EYES CLOSED, RESPIRATIONS EVEN AND REGULAR. CALL LIGHT IN
REACH AND PATIENT IS VISIBLE FROM THE NURSING DESK.

## 2021-12-07 NOTE — NUR
Hpi Title: Evaluation of Skin Lesions PT IS RESTING WITH EYES CLOSED, RESPIRATIONS ARE EVEN AND NONLABORED. BED
ALARM IS ON. Year Removed: 1900